# Patient Record
Sex: MALE | Race: WHITE | URBAN - METROPOLITAN AREA
[De-identification: names, ages, dates, MRNs, and addresses within clinical notes are randomized per-mention and may not be internally consistent; named-entity substitution may affect disease eponyms.]

---

## 2024-08-26 ENCOUNTER — HOSPITAL ENCOUNTER (OUTPATIENT)
Dept: RADIOLOGY | Facility: EXTERNAL LOCATION | Age: 44
Discharge: HOME | End: 2024-08-26

## 2024-08-26 DIAGNOSIS — S69.92XS HAND INJURY, LEFT, SEQUELA: ICD-10-CM

## 2025-06-18 ENCOUNTER — OFFICE VISIT (OUTPATIENT)
Dept: URGENT CARE | Age: 45
End: 2025-06-18
Payer: COMMERCIAL

## 2025-06-18 ENCOUNTER — APPOINTMENT (OUTPATIENT)
Dept: URGENT CARE | Age: 45
End: 2025-06-18

## 2025-06-18 VITALS
DIASTOLIC BLOOD PRESSURE: 63 MMHG | TEMPERATURE: 98 F | OXYGEN SATURATION: 97 % | HEIGHT: 71 IN | SYSTOLIC BLOOD PRESSURE: 126 MMHG | RESPIRATION RATE: 16 BRPM | WEIGHT: 220 LBS | BODY MASS INDEX: 30.8 KG/M2 | HEART RATE: 95 BPM

## 2025-06-18 DIAGNOSIS — S05.01XA ABRASION OF RIGHT CORNEA, INITIAL ENCOUNTER: Primary | ICD-10-CM

## 2025-06-18 RX ORDER — POLYMYXIN B SULFATE AND TRIMETHOPRIM 1; 10000 MG/ML; [USP'U]/ML
1 SOLUTION OPHTHALMIC EVERY 4 HOURS
Qty: 10 ML | Refills: 0 | Status: SHIPPED | OUTPATIENT
Start: 2025-06-18 | End: 2025-06-28

## 2025-06-18 RX ORDER — KETOROLAC TROMETHAMINE 5 MG/ML
1 SOLUTION OPHTHALMIC 4 TIMES DAILY PRN
Qty: 5 ML | Refills: 0 | Status: SHIPPED | OUTPATIENT
Start: 2025-06-18

## 2025-06-18 RX ORDER — AMOXICILLIN 250 MG/1
CAPSULE ORAL
COMMUNITY

## 2025-06-18 ASSESSMENT — VISUAL ACUITY: OU: 1

## 2025-06-18 ASSESSMENT — PATIENT HEALTH QUESTIONNAIRE - PHQ9
1. LITTLE INTEREST OR PLEASURE IN DOING THINGS: NOT AT ALL
2. FEELING DOWN, DEPRESSED OR HOPELESS: NOT AT ALL
SUM OF ALL RESPONSES TO PHQ9 QUESTIONS 1 AND 2: 0

## 2025-06-18 NOTE — PROGRESS NOTES
Subjective   Patient ID: Kolton Jiménez is a 44 y.o. male. They present today with a chief complaint of Injury (My toddler poked me in the eye with either  one or two of her fingers. My eye hurts, is watering, and is red. - Entered by patient) and right eye injury (Patient's child poked him in the right eye about 2 hours ago. Watery and painful).      History of Present Illness  Subjective  Kolton Jiménez is a 44 y.o. male who presents for evaluation of pain, photophobia, and redness in the right eye. Onset of symptoms was abrupt starting 2 hours ago with unchanged course since that time. Symptoms have included blurred vision, pain, photophobia, and tearing. Patient denies blurred vision, itching, photophobia, and visual field deficit. There is no history of of trauma to the eye. There is a history of trauma: pt was poked in the eye by his toddler, and is unsure if there is something in his eye, but it is very painful.    Objective  General: alert and oriented, in no acute distress  Eyes:  negative findings: lids and lashes normal, pupils equal, round, reactive to light and accomodation, visual fields full to confrontation, and no foreign body with everted lid, positive findings: conjunctiva: 1+ injection  Vision: Uncorrected:            L  20/25            R 20/30  Fluorescein:  positive uptake on cornea at approximately 10 o'clock    Assessment/Plan  Corneal abrasion    Discussed the diagnosis and proper care of corneal abrasion.  Ophthalmic drops per orders.  Local eye care discussed.  Analgesics as needed.        History provided by:  Patient and medical records  Injury        Past Medical History  Allergies as of 06/18/2025    (No Known Allergies)       Prescriptions Prior to Admission[1]     Current Medications[2]    Problem List[3]    Surgical History[4]     reports that he has never smoked. He has never used smokeless tobacco.    Review of Systems  As noted in HPI. ROS otherwise negative unless noted.  "      Objective    Vitals:    06/18/25 1546   BP: 126/63   BP Location: Right arm   Patient Position: Sitting   BP Cuff Size: Large adult   Pulse: 95   Resp: 16   Temp: 36.7 °C (98 °F)   TempSrc: Oral   SpO2: 97%   Weight: 99.8 kg (220 lb)   Height: 1.803 m (5' 11\")     No LMP for male patient.    Physical Exam  Vitals and nursing note reviewed.   Constitutional:       General: He is not in acute distress.     Appearance: Normal appearance. He is ill-appearing.   HENT:      Head: Normocephalic and atraumatic.   Eyes:      General: Lids are normal. Lids are everted, no foreign bodies appreciated. Vision grossly intact. Gaze aligned appropriately.         Right eye: Discharge present.      Extraocular Movements: Extraocular movements intact.      Conjunctiva/sclera:      Right eye: Right conjunctiva is injected.      Left eye: Left conjunctiva is not injected.      Pupils: Pupils are equal, round, and reactive to light.      Right eye: Corneal abrasion and fluorescein uptake present. Denise exam negative.     Cardiovascular:      Rate and Rhythm: Normal rate and regular rhythm.   Pulmonary:      Effort: Pulmonary effort is normal.      Breath sounds: Normal breath sounds.   Abdominal:      General: Bowel sounds are normal.      Palpations: Abdomen is soft.   Musculoskeletal:      Cervical back: Normal range of motion and neck supple.   Skin:     General: Skin is warm and dry.      Capillary Refill: Capillary refill takes less than 2 seconds.   Neurological:      Mental Status: He is alert and oriented to person, place, and time.   Psychiatric:         Behavior: Behavior normal.           Ocular Foreign Body Removal    Date/Time: 6/18/2025 4:45 PM    Performed by: FAM Spivey  Authorized by: FAM Spivey    Consent:     Consent obtained:  Verbal    Consent given by:  Patient    Risks, benefits, and alternatives were discussed: yes      Risks discussed:  Bleeding, pain, corneal damage, " visual impairment and infection    Alternatives discussed:  No treatment  Location:     Location:  R corneal  Pre-procedure details:     OS visual acuity:  20/25    OD visual acuity:  20/30    Correction: uncorrected      Fluorescein exam: yes      Fluorescein uptake: yes      Denise test: negative      Corneal abrasion location:  Lateral and superior  Anesthesia:     Local anesthetic:  Tetracaine drops  Procedure details:     Localization method:  Fluorescein    Foreign bodies recovered:  None    Intact foreign body removal: no    Post-procedure details:     Confirmation:  No perforation shown by fluorescein    Procedure completion:  Tolerated well, no immediate complications  Comments:      No foreign bodies appreciated.       Point of Care Test & Imaging Results from this visit  No results found for this or any previous visit.         Diagnostic study results (if any) were reviewed.  (If applicable) preliminary radiology reading: [none]    Assessment/Plan   Allergies, medications, history, and pertinent labs/EKGs/Imaging reviewed.        Medical Decision Making  Risks, benefits, and alternatives of the medications and treatment plan prescribed today were discussed, and patient expressed understanding. Plan follow up as discussed or as needed if any worsening symptoms or change in condition. Reinforced red flags including (but not limited to): severe or worsening pain; difficulty swallowing; stiff neck; shortness of breath; coughing or vomiting blood; chest pain; and new or increased fever are indications to go to the Emergency Department.  At time of discharge patient was clinically well-appearing and HDS for outpatient management. The patient and/or family was educated regarding diagnosis, supportive care, OTC and Rx medications. The patient and/or family was given the opportunity to ask questions prior to discharge.  They verbalized understanding of my discussion of the plans for treatment, expected course,  indications to return to  or seek further evaluation in ED, and the need for timely follow up as directed.   They were provided with a work/school excuse if requested. The after-visit summary was given to the patient and care instructions were reviewed with the patient. All questions were answered and the patient verbalized understanding of the plan of care for today.  Plan:  Recent visit notes reviewed  Meds as above  Increase clear fluids  Pcp follow up this week if not improving or worsening  ER visit anytime 24/7 for acute worsening or changing condition    Orders and Diagnoses  There are no diagnoses linked to this encounter.    Medical Admin Record      Follow Up Instructions  No follow-ups on file.    Patient disposition: Home  [facility]           [1] (Not in a hospital admission)  [2]   Current Outpatient Medications   Medication Sig Dispense Refill    amoxicillin (Amoxil) 250 mg capsule Take by mouth. Pt on amoxicillin for ear infection       No current facility-administered medications for this visit.   [3] There is no problem list on file for this patient.  [4] No past surgical history on file.